# Patient Record
Sex: MALE | Race: AMERICAN INDIAN OR ALASKA NATIVE
[De-identification: names, ages, dates, MRNs, and addresses within clinical notes are randomized per-mention and may not be internally consistent; named-entity substitution may affect disease eponyms.]

---

## 2020-01-29 ENCOUNTER — HOSPITAL ENCOUNTER (EMERGENCY)
Dept: HOSPITAL 5 - ED | Age: 25
LOS: 1 days | Discharge: HOME | End: 2020-01-30
Payer: SELF-PAY

## 2020-01-29 VITALS — DIASTOLIC BLOOD PRESSURE: 73 MMHG | SYSTOLIC BLOOD PRESSURE: 126 MMHG
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
## 2020-01-29 DIAGNOSIS — S76.912A: Primary | ICD-10-CM
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
## 2020-01-29 NOTE — EMERGENCY DEPARTMENT REPORT
Chief Complaint: Extremity Injury, Lower


Stated Complaint: LEG PAIN


Time Seen by Provider: 01/29/20 22:05





- HPI


History of Present Illness: 





24-year-old male that presents with left leg pain and numbness/tingling 

sensation x2 day.  Patient denies any trauma.  Patient does report that he is on

his feet standing for 9-13 hours a day on a electronic pallet .  Patient 

is taking nothing for his pain or discomfort.








- Exam


Vital Signs: 


                                   Vital Signs











  01/29/20





  21:15


 


Temperature 98.0 F


 


Pulse Rate 68


 


Respiratory 18





Rate 


 


Blood Pressure 126/73


 


O2 Sat by Pulse 98





Oximetry 











Physical Exam: 





Patient's alert and oriented 3 no acute distress.


Patient has left upper legs muscle tenderness.  Range of motion strength 5 out 

of 5 and sensation intact.


Neuro patient is able to ambulate without difficulty.


MSE screening note: 


Focused history and physical exam performed.


Due to findings the following was ordered:





24-year-old male that presents with left leg pain and numbness/tingling 

sensation x2 day.  Patient denies any trauma.  Patient does report that he is on

his feet standing for 9-13 hours a day on a electronic pallet .  Patient 

is taking nothing for his pain or discomfort.











Recommend patient to take ibuprofen for pain management.  Discussed the patient 

to follow up with the primary care provider symptoms persist or gets worse.





ED Disposition for MSE


Clinical Impression: 


 Muscle strain of left thigh





Disposition: DC-01 TO HOME OR SELFCARE


Is pt being admited?: No


Does the pt Need Aspirin: No


Condition: Stable


Instructions:  Muscle Strain (ED)


Additional Instructions: 


Over-the-counter ibuprofen 600 mg every 6-8 hours as needed.  Follow-up with the

primary care provider.


Referrals: 


RANDI CANO MD [Primary Care Provider] - 3-5 Days


HERMILA DYE MD [Staff Physician] - 3-5 Days

## 2020-01-29 NOTE — EMERGENCY DEPARTMENT REPORT
Blank Doc





- Documentation


Documentation: 





24-year-old male that presents with left leg pain and numbness/tingling sensai

ton x1 day.





This initial assessment/diagnostic orders/clinical plan/treatment(s) is/are 

subject to change based on patient's health status, clinical progression and re-

assessment by fellow clinical providers in the ED.  Further treatment and workup

at subsequent clinical providers discretion.  Patient/guardians urged not to 

elope from the ED as their condition may be serious if not clinically assessed 

and managed.  Initial orders include:


1- Patient sent to [facility] for further evaluation and treatment


2- doppler US

## 2020-01-29 NOTE — VASCULAR LAB REPORT
DUPLEX DOPPLER LOWER EXTREMITY VEINS, LEFT



INDICATION:

left leg pain.



TECHNIQUE:

Duplex doppler imaging was performed through the veins of the left lower extremity using venous compr
ession and other maneuvers.



COMPARISON: 

None available.



FINDINGS:

Common femoral vein: Negative.

Superficial femoral vein: Negative.

Popliteal vein: Negative.

Calf veins: Negative.



Additional findings: None.



IMPRESSION:

1. No sonographic evidence for DVT in the left lower extremity.



Signer Name: Guerrero Sweeney MD 

Signed: 1/29/2020 11:13 PM

 Workstation Name: [id number]

## 2020-03-18 ENCOUNTER — HOSPITAL ENCOUNTER (EMERGENCY)
Dept: HOSPITAL 5 - ED | Age: 25
Discharge: HOME | End: 2020-03-18
Payer: SELF-PAY

## 2020-03-18 DIAGNOSIS — Z79.899: ICD-10-CM

## 2020-03-18 DIAGNOSIS — J06.9: Primary | ICD-10-CM

## 2020-03-18 DIAGNOSIS — F17.200: ICD-10-CM

## 2020-03-18 PROCEDURE — 99283 EMERGENCY DEPT VISIT LOW MDM: CPT

## 2020-03-18 PROCEDURE — 71046 X-RAY EXAM CHEST 2 VIEWS: CPT

## 2020-03-18 NOTE — EMERGENCY DEPARTMENT REPORT
- General


Chief Complaint: Headache


Stated Complaint: BODYACHES


Time Seen by Provider: 03/18/20 19:32


Source: patient


Mode of arrival: Ambulatory


Limitations: No Limitations





- History of Present Illness


MD Complaint: cough (fty), sore throat, rhinorrhea, nasal congestion


-: days(s)


Severity: mild


Quality: dull


Consistency: constant


Improves With: nothing


Worsens With: nothing


Associated Symptoms: headache, rhinorrhea, nasal congestion, cough, nausea





- Related Data


                                  Previous Rx's











 Medication  Instructions  Recorded  Last Taken  Type


 


Amoxicillin [Trimox CAP] 500 mg PO Q8H #30 capsule 03/29/16 Unknown Rx


 


Ibuprofen [Motrin] 600 mg PO Q8H PRN #40 tablet 03/29/16 Unknown Rx


 


Loratadine (Nf) [Claritin] 10 mg PO DAILY #30 tablet 03/29/16 Unknown Rx


 


Prednisone [predniSONE 10 mg 10 mg PO .TAPER #1 tab.ds.pk 03/29/16 Unknown Rx





(6-Day Pack, 21 Tabs)]    


 


Brompheniramine/Pseudoephed/Dm 5 ml PO Q6H PRN #240 syrup 03/18/20 Unknown Rx





[Yfykaysqzp-Xlbsrcvjzey-Cw Syr]    


 


predniSONE [Deltasone] 20 mg PO QDAY #5 tab 03/18/20 Unknown Rx











                                    Allergies











Allergy/AdvReac Type Severity Reaction Status Date / Time


 


No Known Allergies Allergy   Unverified 03/29/16 22:14














ED Review of Systems


ROS: 


Stated complaint: BODYACHES


Other details as noted in HPI





Comment: All other systems reviewed and negative





ED Past Medical Hx





- Past Medical History


Previous Medical History?: No





- Surgical History


Past Surgical History?: No





- Social History


Smoking Status: Current Every Day Smoker


Substance Use Type: None





- Medications


Home Medications: 


                                Home Medications











 Medication  Instructions  Recorded  Confirmed  Last Taken  Type


 


Amoxicillin [Trimox CAP] 500 mg PO Q8H #30 capsule 03/29/16  Unknown Rx


 


Ibuprofen [Motrin] 600 mg PO Q8H PRN #40 tablet 03/29/16  Unknown Rx


 


Loratadine (Nf) [Claritin] 10 mg PO DAILY #30 tablet 03/29/16  Unknown Rx


 


Prednisone [predniSONE 10 mg 10 mg PO .TAPER #1 tab.ds.pk 03/29/16  Unknown Rx





(6-Day Pack, 21 Tabs)]     


 


Brompheniramine/Pseudoephed/Dm 5 ml PO Q6H PRN #240 syrup 03/18/20  Unknown Rx





[Oaysmgpblh-Gtljqdfuyif-Jc Syr]     


 


predniSONE [Deltasone] 20 mg PO QDAY #5 tab 03/18/20  Unknown Rx














ED Physical Exam





- General


Limitations: No Limitations


General appearance: alert, in no apparent distress





- Head


Head exam: Present: atraumatic, normocephalic





- Eye


Eye exam: Present: normal appearance, PERRL, EOMI





- ENT


ENT exam: Present: mucous membranes moist, other (Nasal congestion bilaterally 

with clear drainage.  There is some erythema to the left mucosa with swelling.  

Small effusion behind the right tympanic membrane.)





- Neck


Neck exam: Present: normal inspection





- Respiratory


Respiratory exam: Present: normal lung sounds bilaterally.  Absent: respiratory 

distress





- Cardiovascular


Cardiovascular Exam: Present: regular rate, normal rhythm.  Absent: systolic 

murmur, diastolic murmur, rubs, gallop





- GI/Abdominal


GI/Abdominal exam: Present: soft, normal bowel sounds





- Rectal


Rectal exam: Present: deferred





- Extremities Exam


Extremities exam: Present: normal inspection





- Back Exam


Back exam: Present: normal inspection.  Absent: CVA tenderness (R), CVA 

tenderness (L)





- Neurological Exam


Neurological exam: Present: alert, oriented X3, CN II-XII intact





- Psychiatric


Psychiatric exam: Present: normal affect, normal mood





- Skin


Skin exam: Present: warm, dry, intact, normal color.  Absent: rash





ED Course





                                   Vital Signs











  03/18/20





  15:57


 


Temperature 98.0 F


 


Pulse Rate 71


 


Respiratory 18





Rate 


 


O2 Sat by Pulse 97





Oximetry 














ED Medical Decision Making





- Medical Decision Making





This 24-year-old patient presents with symptoms suspicious for likely viral 

upper respiratory tract infection.  Differential includes bacterial pneumonia, 

sinusitis, allergic rhinitis, bronchitis.  Do not suspect underlying 

Cardiopulmonary process.  I considered but think unlikely dangerous cause of 

this patient symptoms to include acute coronary syndrome, CHF or COPD 

exacerbations, pneumonia, pneumothorax.  Patient is nontoxic appearing and not 

in need of emergent medical intervention.





Plan: Reassurance, reassessment, over-the-counter medications, discharge with 

PCP follow-up


Critical care attestation.: 


If time is entered above; I have spent that time in minutes in the direct care 

of this critically ill patient, excluding procedure time.








ED Disposition


Clinical Impression: 


 URI (upper respiratory infection)





Disposition: DC-01 TO HOME OR SELFCARE


Is pt being admited?: No


Does the pt Need Aspirin: No


Condition: Stable


Instructions:  Upper Respiratory Infection (ED)


Prescriptions: 


Brompheniramine/Pseudoephed/Dm [Rsluatnkai-Slwzezxfxfm-Mw Syr] 5 ml PO Q6H PRN 

#240 syrup


 PRN Reason: Cough


predniSONE [Deltasone] 20 mg PO QDAY #5 tab


Referrals: 


PRIMARY CARE,MD [Primary Care Provider] - 3-5 Days


[facility] [Provider Group] - 3-5 Days

## 2020-03-18 NOTE — XRAY REPORT
CHEST 2 VIEWS 



INDICATION / CLINICAL INFORMATION:

cough.



COMPARISON: 

None available.



FINDINGS:



SUPPORT DEVICES: None.



HEART / MEDIASTINUM: No significant abnormality. 



LUNGS / PLEURA: No significant pulmonary or pleural abnormality. No pneumothorax. 



ADDITIONAL FINDINGS: No significant additional findings.



IMPRESSION:

1. No acute findings.



Signer Name: ASHELY Gautam MD 

Signed: 3/18/2020 4:26 PM

Workstation Name: [id number]

## 2020-03-18 NOTE — EVENT NOTE
ED Screening Note


Date of service: 03/18/20


Time: 15:58


ED Screening Note: 


25 yo male presnts to Ed cc of headache with coughing and body aches x 1 day


cc of chills and not feeling well


 no recent travel





This initial assessment/diagnostic orders/clinical plan/treatment(s) is/are 

subject to change based on patients health status, clinical progression and re-

assessment by fellow clinical providers in the ED. Further treatment and workup 

at subsequent clinical providers discretion. Patient/guardian urged not to elope

from the ED as their condition may be serious if not clinically assessed and 

managed. 





Initial orders include: 


cxr


acc eval

## 2020-08-23 ENCOUNTER — HOSPITAL ENCOUNTER (EMERGENCY)
Dept: HOSPITAL 5 - ED | Age: 25
LOS: 1 days | Discharge: HOME | End: 2020-08-24
Payer: COMMERCIAL

## 2020-08-23 DIAGNOSIS — M54.2: Primary | ICD-10-CM

## 2020-08-23 DIAGNOSIS — M54.6: ICD-10-CM

## 2020-08-23 DIAGNOSIS — Z79.1: ICD-10-CM
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
## 2020-08-23 DIAGNOSIS — Z79.2: ICD-10-CM

## 2020-08-23 DIAGNOSIS — Z79.899: ICD-10-CM
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
## 2020-08-23 DIAGNOSIS — M25.562: ICD-10-CM
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
## 2020-08-23 PROCEDURE — 72125 CT NECK SPINE W/O DYE: CPT

## 2020-08-23 PROCEDURE — 72100 X-RAY EXAM L-S SPINE 2/3 VWS: CPT

## 2020-08-23 PROCEDURE — 72070 X-RAY EXAM THORAC SPINE 2VWS: CPT

## 2020-08-23 PROCEDURE — 70450 CT HEAD/BRAIN W/O DYE: CPT
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
## 2020-08-24 VITALS — DIASTOLIC BLOOD PRESSURE: 59 MMHG | SYSTOLIC BLOOD PRESSURE: 118 MMHG
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
## 2020-08-24 NOTE — XRAY REPORT
EXAMINATION: Lumbar spine radiograph series, 2 views



CLINICAL INFORMATION: None



COMPARISON: None.



FINDINGS: There is mild loss of normal lumbar lordosis, which may be secondary to position. Vertebral
 body height and intervertebral disc spaces appear well maintained. No significant bony degenerative 
changes are noted.



IMPRESSION: No radiographic evidence of acute bony abnormality of the lumbar spine.



Signer Name: Jaimie Hager MD 

Signed: 8/24/2020 12:36 AM

Workstation Name: [id number]
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
